# Patient Record
(demographics unavailable — no encounter records)

---

## 2024-11-15 NOTE — PLAN
[FreeTextEntry1] : Differential diagnosis, work up ,management and evaluation of above mentioned concerns extensively reviewed Extensive review of above mentioned concerns.  All questions and concerns addressed, patient expressed understanding. Encouraged to contact the office with any questions or concerns.

## 2024-11-15 NOTE — PHYSICAL EXAM
[Chaperone Present] : A chaperone was present in the examining room during all aspects of the physical examination [FreeTextEntry2] : Elham Grfa [Appropriately responsive] : appropriately responsive [Alert] : alert [No Acute Distress] : no acute distress [No Lymphadenopathy] : no lymphadenopathy [Soft] : soft [Non-tender] : non-tender [Non-distended] : non-distended [No HSM] : No HSM [No Lesions] : no lesions [No Mass] : no mass [Oriented x3] : oriented x3 [Examination Of The Breasts] : a normal appearance [No Masses] : no breast masses were palpable [Labia Majora] : normal [Labia Minora] : normal [Discharge] : discharge [Moderate] : moderate [Foul Smelling] : not foul smelling [White] : white [Cheesy] : cheesy [IUD String] : an IUD string was noted [Normal] : normal [Enlarged ___ wks] : enlarged [unfilled] ~Uweeks [Uterine Adnexae] : normal

## 2024-11-15 NOTE — HISTORY OF PRESENT ILLNESS
[FreeTextEntry1] : c/o vaginal DC and episode AUB--prolonged and light [Mammogramdate] : 2024 [BreastSonogramDate] : 2024 [PapSmeardate] : 2023 [ColonoscopyDate] : 2024 [Previously active] : previously active [Men] : men [Patient refuses STI testing] : Patient refuses STI testing

## 2024-11-15 NOTE — PHYSICAL EXAM
[Chaperone Present] : A chaperone was present in the examining room during all aspects of the physical examination [FreeTextEntry2] : Elham Graf [Appropriately responsive] : appropriately responsive [Alert] : alert [No Acute Distress] : no acute distress [No Lymphadenopathy] : no lymphadenopathy [Soft] : soft [Non-tender] : non-tender [Non-distended] : non-distended [No HSM] : No HSM [No Lesions] : no lesions [No Mass] : no mass [Oriented x3] : oriented x3 [Examination Of The Breasts] : a normal appearance [No Masses] : no breast masses were palpable [Labia Majora] : normal [Labia Minora] : normal [Discharge] : discharge [Moderate] : moderate [Foul Smelling] : not foul smelling [White] : white [Cheesy] : cheesy [IUD String] : an IUD string was noted [Normal] : normal [Enlarged ___ wks] : enlarged [unfilled] ~Uweeks [Uterine Adnexae] : normal

## 2025-01-30 NOTE — REASON FOR VISIT
[Follow-Up Visit] : a follow-up visit for [Degenerative Joint Disease] : degenerative joint disease [Back Pain] : back pain [Neck Pain] : neck pain

## 2025-01-30 NOTE — PHYSICAL EXAM
[de-identified] : She is comfortable sitting.  There is no paravertebral muscle spasm.  There is no trochanteric tenderness.  There is mild right-sided sciatic notch sensitivity.  Forward flexion of the spine is 70 degrees cause some mild lower back pain.  Her lower extremity neurological examination revealed 1-2+ symmetrical reflexes.  Motor power is normal to manual testing all lower extremity groups and sensation is normal to light touch in all dermatomes.  Straight leg raising is negative to 90 degrees in the sitting position bilaterally.  Range of motion of the cervical spine showed flexion with the chin reaching to within 1-1/2 fingerbreadths of the chest, extension of 80 degrees both without apparent pain.  Rotation was to 65 degrees bilaterally with some mild pain and tilt of 30 degrees with some mild pain. [de-identified] : AP and lateral x-rays of the lumbar spine are obtained.  Sagittal alignment is normal.  There are no destructive changes.

## 2025-01-30 NOTE — HISTORY OF PRESENT ILLNESS
[de-identified] : She returns for recurrence and follow-up of chronic spine problems and I have treated her for since 2014.  In the past she was treated for upper back and neck problems related to degenerative changes in her cervical spine.  X-rays revealed an increase in degenerative changes over the years.  In 2019 she had an MRI of the cervical spine that revealed multiple minor disc bulges without evidence of cord or nerve root compression.  She also had an MRI of her lumbar spine at that time that look normal.  She had been treated in the past with ibuprofen without significant relief but when treated with diclofenac his symptoms resolved and she was not seen for follow-up.  Recently she made a trip to [place] and fell asleep on the plane and had a massage on the beach that significantly aggravated her neck and upper back symptoms and she has pain with range of motion of the cervical spine and recently has also had some midline lower back pain without associated leg pain. [Pain Location] : pain [Worsening] : worsening [7] : a maximum pain level of 7/10

## 2025-01-30 NOTE — DISCUSSION/SUMMARY
[Medication Risks Reviewed] : Medication risks reviewed [de-identified] : We again discussed daily activities she needs to avoid which can and will aggravate and propagate neck's symptoms and we have discussed these in 2014 and again in 2023.  She will use moist heat.  She has been started on diclofenac 75 mg twice a day as a nonsteroidal anti-inflammatory.  She will call if there are problems with the medication or worsening of her symptoms and I will see her for follow-up in 4 weeks.

## 2025-04-01 NOTE — DISCUSSION/SUMMARY
[FreeTextEntry1] : This is a 50 year old woman with hypertension who presents to the office for a cardiac evaluation.  She has been feeling unwell and near syncopal, and went to the ED twice.  She first went to [facility], and then [facility].  While at [facility] she had an echocardiogram that showed mild LV dysfunction.  She was then sent for a nuclear stress test, and this showed no evidence of ischemia and normal LV function and wall motion.  She had an elevated LDL, and was discharged with follow up.  She had been taking metoprolol and HCTZ< and took it upon herself to wean the Toprol.  She feels better now off of the Toprol.    She may have felt unwell and near syncopal from taking Toprol and HCTZ, as her BP is normal on just HCTZ.  Her ischemia evaluation was normal.  Her LDL is high, but she has a normal TG to HDL ratio.  She is going to try to bring the LDL down with lifestyle.  She will follow in six months, and will have repeat blood work at that time.  [EKG obtained to assist in diagnosis and management of assessed problem(s)] : EKG obtained to assist in diagnosis and management of assessed problem(s)

## 2025-04-01 NOTE — HISTORY OF PRESENT ILLNESS
[FreeTextEntry1] : This is a 50 year old woman with hypertension who presents to the office for a cardiac evaluation.  She has been feeling unwell and near syncopal, and went to the ED twice.  She first went to [place], and then [facility].  While at [facility] she had an echocardiogram that showed mild LV dysfunction.  She was then sent for a nuclear stress test, and this showed no evidence of ischemia and normal LV function and wall motion.  She had an elevated LDL, and was discharged with follow up.  She had been taking metoprolol and HCTZ< and took it upon herself to wean the Toprol.  She feels better now off of the Toprol.  She denies chest pains, increased dyspnea, PND, orthopnea, LE swelling, dizziness, or syncope.

## 2025-07-11 NOTE — ASSESSMENT
[FreeTextEntry1] : Exam and evaluation were performed.   Radiographs (3v of bilateral foot) were taken and reviewed to show no acute fracture or dislocation.   The patient presents with two issues: 1) right dorsal midfoot pain secondary to bursitis / synovitis and 2) left dorsal hallux pain secondary to a contusion type injury. Regarding the right foot, her symptoms appear to be secondary to inflammation around the dorsal midfoot region. I discussed treatment options and prognosis with patient. Ultrasound guidance was used to better determine the etiology of her pain. After further discussion it was agreed to give the patient an injection to reduce inflammation and associated pain and to improve function. The area to be injected was wiped thoroughly with sterile alcohol. Then using a combination of 1.0 cc of Celestone acetate suspension combined with 1 cc of Dexamethasone sodium phosphate and 1.0 cc of Lidocaine plain, an injection was given at the right dorsal midfoot (20604). Sterile gauze was used for compression and then a sterile Band-Aid was applied to the injection site. The possibility of a flair reaction was discussed with the patient prior to administering the injection. Long term prognosis was discussed. Further treatment will be dictated based on the response to today's treatments.   Regarding the left foot, this area is likely residually inflamed secondary to a contusion type injury. I am recommending monitoring of this to ensure symptoms improve. Stretching exercises were demonstrated which she is to begin to do on a consistent basis. All questions were answered to patient's satisfaction. PTR in 2 weeks for follow up and reassessment.
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
## 2025-07-11 NOTE — PHYSICAL EXAM
[General Appearance - Alert] : alert [Ankle Swelling (On Exam)] : present [Ankle Swelling On The Right] : mild [2+] : left foot dorsalis pedis 2+ [Skin Color & Pigmentation] : normal skin color and pigmentation [Sensation] : the sensory exam was normal to light touch and pinprick [Oriented To Time, Place, And Person] : oriented to person, place, and time [Varicose Veins Of Lower Extremities] : not present [] : not present [Delayed in the Right Toes] : capillary refills normal in right toes [Delayed in the Left Toes] : capillary refills normal in the left toes [de-identified] : Rectus foot structure is noted bilaterally. There is tightness of the achilles tendon complex noted bilaterally. On the right, there is overlying bursitis to the midfoot region with moderate pain upon palpation at the dorsal right midfoot, just lateral to the EHL tendon. There is localized edema to this area. On the left, there is mild to moderate pain upon palpation of the dorsal left hallux IPJ region with mild to moderate pain upon extreme plantarflexion of the IPJ. There is no clinical deviation of the digit.  comprehension

## 2025-07-18 NOTE — PLAN
[FreeTextEntry1] : Reviewed menopause transition at length Reviewed Mirena benefits and use in the menopause time frame RTO prn

## 2025-07-18 NOTE — HISTORY OF PRESENT ILLNESS
[FreeTextEntry1] : This 49 yo presents reporting change in menstrual cycle; LMP 7/13 and prior to this she had a cycle back in April; she did have vasomotor symptoms, that are not bothersome at this time; she has a Mirena IUD in place since 10/4/22, and is asking how will she know she is in menopause.  Denies any irregular vaginal staining. Aware of some weight gain.

## 2025-07-22 NOTE — HISTORY OF PRESENT ILLNESS
[FreeTextEntry1] : establish care annual physical  [de-identified] : Ms. LANDRY is a 50 year F with PMHx of anxiety, HTN, anemia, arthritis, HLD who presents today for new patient eval, annual physical exam and establish care. Pt stopped metoprolol bc it would lower her heart rate, would make her faint, had to go to hospital, now feels palpitations occasionally. Pt also reports right flank pain starting in the last 6 months.  Denies chest pain, sob, sellers, dizziness, diaphoresis, palpitations, LE swelling, orthopnea, syncope, n/v, headache.

## 2025-07-22 NOTE — ADDENDUM
[FreeTextEntry1] : This note was written by Miladys Maloney on 07/22/2025 acting as medical scribe for Dr. Shelby Craig. I, Dr. Shelby Craig, have read and attest that all the information, medical decision making and discharge instructions within are true and accurate.

## 2025-07-22 NOTE — HEALTH RISK ASSESSMENT
[Yes] : Yes [No] : In the past 12 months have you used drugs other than those required for medical reasons? No [No falls in past year] : Patient reported no falls in the past year [0] : 2) Feeling down, depressed, or hopeless: Not at all (0) [PHQ-2 Negative - No further assessment needed] : PHQ-2 Negative - No further assessment needed [Former] : Former [[id number]] : 0

## 2025-07-22 NOTE — HISTORY OF PRESENT ILLNESS
[FreeTextEntry1] : establish care annual physical  [de-identified] : Ms. LANDRY is a 50 year F with PMHx of anxiety, HTN, anemia, arthritis, HLD who presents today for new patient eval, annual physical exam and establish care. Pt stopped metoprolol bc it would lower her heart rate, would make her faint, had to go to hospital, now feels palpitations occasionally. Pt also reports right flank pain starting in the last 6 months.  Denies chest pain, sob, sellers, dizziness, diaphoresis, palpitations, LE swelling, orthopnea, syncope, n/v, headache.

## 2025-07-22 NOTE — HEALTH RISK ASSESSMENT
[Yes] : Yes [No] : In the past 12 months have you used drugs other than those required for medical reasons? No [No falls in past year] : Patient reported no falls in the past year [0] : 2) Feeling down, depressed, or hopeless: Not at all (0) [PHQ-2 Negative - No further assessment needed] : PHQ-2 Negative - No further assessment needed [Former] : Former [[id number]] : 0